# Patient Record
Sex: FEMALE | Race: OTHER | HISPANIC OR LATINO | URBAN - METROPOLITAN AREA
[De-identification: names, ages, dates, MRNs, and addresses within clinical notes are randomized per-mention and may not be internally consistent; named-entity substitution may affect disease eponyms.]

---

## 2019-02-18 ENCOUNTER — EMERGENCY (EMERGENCY)
Facility: HOSPITAL | Age: 79
LOS: 1 days | Discharge: HOME | End: 2019-02-18
Attending: EMERGENCY MEDICINE | Admitting: EMERGENCY MEDICINE
Payer: MEDICARE

## 2019-02-18 VITALS
RESPIRATION RATE: 18 BRPM | SYSTOLIC BLOOD PRESSURE: 142 MMHG | HEART RATE: 109 BPM | TEMPERATURE: 102 F | OXYGEN SATURATION: 98 % | DIASTOLIC BLOOD PRESSURE: 80 MMHG

## 2019-02-18 DIAGNOSIS — K57.80 DIVERTICULITIS OF INTESTINE, PART UNSPECIFIED, WITH PERFORATION AND ABSCESS WITHOUT BLEEDING: Chronic | ICD-10-CM

## 2019-02-18 DIAGNOSIS — J20.5 ACUTE BRONCHITIS DUE TO RESPIRATORY SYNCYTIAL VIRUS: ICD-10-CM

## 2019-02-18 DIAGNOSIS — T81.89XA OTHER COMPLICATIONS OF PROCEDURES, NOT ELSEWHERE CLASSIFIED, INITIAL ENCOUNTER: ICD-10-CM

## 2019-02-18 DIAGNOSIS — E83.42 HYPOMAGNESEMIA: ICD-10-CM

## 2019-02-18 DIAGNOSIS — I10 ESSENTIAL (PRIMARY) HYPERTENSION: ICD-10-CM

## 2019-02-18 DIAGNOSIS — R10.9 UNSPECIFIED ABDOMINAL PAIN: ICD-10-CM

## 2019-02-18 DIAGNOSIS — R10.84 GENERALIZED ABDOMINAL PAIN: ICD-10-CM

## 2019-02-18 DIAGNOSIS — Z90.49 ACQUIRED ABSENCE OF OTHER SPECIFIED PARTS OF DIGESTIVE TRACT: Chronic | ICD-10-CM

## 2019-02-18 DIAGNOSIS — E87.6 HYPOKALEMIA: ICD-10-CM

## 2019-02-18 DIAGNOSIS — E78.5 HYPERLIPIDEMIA, UNSPECIFIED: ICD-10-CM
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
## 2019-02-18 LAB
ALBUMIN SERPL ELPH-MCNC: 2.8 G/DL — LOW (ref 3.4–5)
ALP SERPL-CCNC: 169 U/L — HIGH (ref 40–120)
ALT FLD-CCNC: 50 U/L — HIGH (ref 12–42)
ANION GAP SERPL CALC-SCNC: 14 MMOL/L — SIGNIFICANT CHANGE UP (ref 9–16)
APTT BLD: 29.3 SEC — SIGNIFICANT CHANGE UP (ref 27.5–36.3)
AST SERPL-CCNC: 61 U/L — HIGH (ref 15–37)
BASOPHILS NFR BLD AUTO: 0.5 % — SIGNIFICANT CHANGE UP (ref 0–2)
BILIRUB SERPL-MCNC: 0.3 MG/DL — SIGNIFICANT CHANGE UP (ref 0.2–1.2)
BUN SERPL-MCNC: 8 MG/DL — SIGNIFICANT CHANGE UP (ref 7–23)
CALCIUM SERPL-MCNC: 8.6 MG/DL — SIGNIFICANT CHANGE UP (ref 8.5–10.5)
CHLORIDE SERPL-SCNC: 103 MMOL/L — SIGNIFICANT CHANGE UP (ref 96–108)
CO2 SERPL-SCNC: 22 MMOL/L — SIGNIFICANT CHANGE UP (ref 22–31)
CREAT SERPL-MCNC: 0.48 MG/DL — LOW (ref 0.5–1.3)
EOSINOPHIL NFR BLD AUTO: 0.4 % — SIGNIFICANT CHANGE UP (ref 0–6)
FLU A RESULT: SIGNIFICANT CHANGE UP
FLU A RESULT: SIGNIFICANT CHANGE UP
FLUAV AG NPH QL: SIGNIFICANT CHANGE UP
FLUBV AG NPH QL: SIGNIFICANT CHANGE UP
GLUCOSE SERPL-MCNC: 117 MG/DL — HIGH (ref 70–99)
HCT VFR BLD CALC: 33.2 % — LOW (ref 34.5–45)
HGB BLD-MCNC: 10.9 G/DL — LOW (ref 11.5–15.5)
IMM GRANULOCYTES NFR BLD AUTO: 0.5 % — SIGNIFICANT CHANGE UP (ref 0–1.5)
INR BLD: 1.15 — SIGNIFICANT CHANGE UP (ref 0.88–1.16)
LACTATE SERPL-SCNC: 0.8 MMOL/L — SIGNIFICANT CHANGE UP (ref 0.4–2)
LYMPHOCYTES # BLD AUTO: 17 % — SIGNIFICANT CHANGE UP (ref 13–44)
MCHC RBC-ENTMCNC: 32.8 G/DL — SIGNIFICANT CHANGE UP (ref 32–36)
MCHC RBC-ENTMCNC: 33.1 PG — SIGNIFICANT CHANGE UP (ref 27–34)
MCV RBC AUTO: 100.9 FL — HIGH (ref 80–100)
MONOCYTES NFR BLD AUTO: 8.1 % — SIGNIFICANT CHANGE UP (ref 2–14)
NEUTROPHILS NFR BLD AUTO: 73.5 % — SIGNIFICANT CHANGE UP (ref 43–77)
PLATELET # BLD AUTO: 236 K/UL — SIGNIFICANT CHANGE UP (ref 150–400)
POTASSIUM SERPL-MCNC: 2.8 MMOL/L — CRITICAL LOW (ref 3.5–5.3)
POTASSIUM SERPL-SCNC: 2.8 MMOL/L — CRITICAL LOW (ref 3.5–5.3)
PROT SERPL-MCNC: 6.6 G/DL — SIGNIFICANT CHANGE UP (ref 6.4–8.2)
PROTHROM AB SERPL-ACNC: 12.8 SEC — SIGNIFICANT CHANGE UP (ref 10–12.9)
RBC # BLD: 3.29 M/UL — LOW (ref 3.8–5.2)
RBC # FLD: 15.5 % — HIGH (ref 10.3–14.5)
RSV RESULT: DETECTED
RSV RNA RESP QL NAA+PROBE: DETECTED
SODIUM SERPL-SCNC: 139 MMOL/L — SIGNIFICANT CHANGE UP (ref 132–145)
WBC # BLD: 5.7 K/UL — SIGNIFICANT CHANGE UP (ref 3.8–10.5)
WBC # FLD AUTO: 5.7 K/UL — SIGNIFICANT CHANGE UP (ref 3.8–10.5)

## 2019-02-18 PROCEDURE — 74176 CT ABD & PELVIS W/O CONTRAST: CPT | Mod: 26

## 2019-02-18 PROCEDURE — 99284 EMERGENCY DEPT VISIT MOD MDM: CPT | Mod: 25

## 2019-02-18 PROCEDURE — 93010 ELECTROCARDIOGRAM REPORT: CPT

## 2019-02-18 PROCEDURE — 71250 CT THORAX DX C-: CPT | Mod: 26

## 2019-02-18 RX ORDER — ACETAMINOPHEN 500 MG
975 TABLET ORAL ONCE
Qty: 0 | Refills: 0 | Status: COMPLETED | OUTPATIENT
Start: 2019-02-18 | End: 2019-02-18

## 2019-02-18 RX ORDER — POTASSIUM CHLORIDE 20 MEQ
40 PACKET (EA) ORAL ONCE
Qty: 0 | Refills: 0 | Status: COMPLETED | OUTPATIENT
Start: 2019-02-18 | End: 2019-02-18

## 2019-02-18 RX ORDER — SODIUM CHLORIDE 9 MG/ML
2000 INJECTION INTRAMUSCULAR; INTRAVENOUS; SUBCUTANEOUS ONCE
Qty: 0 | Refills: 0 | Status: DISCONTINUED | OUTPATIENT
Start: 2019-02-18 | End: 2019-02-19

## 2019-02-18 RX ORDER — CEFTRIAXONE 500 MG/1
1 INJECTION, POWDER, FOR SOLUTION INTRAMUSCULAR; INTRAVENOUS ONCE
Qty: 0 | Refills: 0 | Status: DISCONTINUED | OUTPATIENT
Start: 2019-02-18 | End: 2019-02-19

## 2019-02-18 RX ADMIN — Medication 975 MILLIGRAM(S): at 22:08

## 2019-02-18 NOTE — ED PROVIDER NOTE - NSRISKOFTRANSFER_ED_A_ED
Deterioration of Condition En Route/Increased Pain/Transportation Risk (There is always a risk of traffic delays resulting in deterioration of condition.)/Death or Disability

## 2019-02-18 NOTE — ED PROVIDER NOTE - CLINICAL SUMMARY MEDICAL DECISION MAKING FREE TEXT BOX
meets sepsis criteria at triage, sepsis labs/antibiotics/fluids ordered, flu swab, imaging.     //after many attempts by 4 different ED providers, unable to obtain IV access. labs sent. patient does not wish for us to try to obtain IV access anymore at this time.

## 2019-02-18 NOTE — ED PROVIDER NOTE - OBJECTIVE STATEMENT
79 y/o female with PMHx HTN, HLD, diverticulitis, PSHx of cholecystectomy, NKDA, presents to ED with c/o abd pain x1 day. Pt states she was at home today when she began to feel worsening left abd pain and HA. Pt reports having a recent surgery at [facility] for perforated diverticulitis on Dec 27. She reports she was in the hospital for 2 weeks after the operation, and she was then in [facility] for appx 4 weeks (discharged on 2/14). Pt presents today with a colostomy bag with a small amount of stool. She states she received a new bag after her operation and she last cleaned it this morning. She admits to vomiting today, cough/congestion (x1 day), nausea, abd pain, HA. Pt denies diarrhea, no hx of stroke, no hx of kidney or heart problems. 79 y/o female with PMHx HTN, HLD PSHx of cholecystectomy, s/p perforated diverticuli with ostomy bag in place end of December 2018 at [facility] with Will Nye presents to ED with c/o abdominal pain x1 day. Pt states she was at home today when she began to feel abdominal pain. Pt reports having a recent surgery at [facility] for perforated diverticulitis on Dec 27 - she reports she was in the hospital for 2 weeks after the surgery and she was then in [facility] for appx 4 weeks (discharged on 2/14). Pt presents today with a colostomy bag with a small amount of stool. She states she received a new bag after her operation and she last cleaned it this morning. She admits to vomiting today, cough/congestion (x1 day), nausea, abd pain, HA. Pt denies diarrhea, no hx of stroke, no hx of kidney or heart problems. 79 y/o female with PMHx HTN, HLD PSHx of cholecystectomy, s/p perforated diverticuli with ostomy bag in place end of December 2018 at [facility] with Will Nye presents to ED with c/o abdominal pain x1 day. Pt states she was at home today when she began to feel abdominal pain. Pt reports having a recent surgery at [facility] for perforated diverticulitis on Dec 27 - she reports she was in the hospital for 2 weeks after the surgery, was then in [facility] for appropriately 4 weeks (discharged on 2/14). c/o vomiting today, cough/congestion (x1 day), nausea, abominal pain. Pt denies diarrhea. No fever or chills. 77 y/o female with PMHx HTN, HLD PSHx of cholecystectomy, s/p perforated diverticuli with ostomy bag in place, surgery on Dec 27, 2018 at [facility] with Will Nye presents to ED with c/o abdominal pain x1 day with associated nausea and vomiting. Also c/o cough/congestion x 2 days. Pt denies diarrhea. No fever or chills. Normal amount of stool output in colostomy bag. 79 y/o female with PMHx HTN, HLD PSHx of cholecystectomy, s/p perforated diverticuli with ostomy bag in place, surgery on Dec 27, 2018 at [facility] with Will Nye presents to ED with c/o abdominal pain x 1 day with associated nausea and vomiting. Also c/o cough/congestion x 2 days. Pt denies diarrhea. No fever or chills. Normal amount of stool output in colostomy bag.

## 2019-02-18 NOTE — ED PROVIDER NOTE - PROGRESS NOTE DETAILS
CT shows 4.6 x 2.0 x 2.4 cm gas and soft tissue/fluid collection in the infraumbilical midline incision, appeared to communicate with the skin surface. Spoke with [facility] Dr. Mark covering for Dr. Medellin, will admit to [facility] surgical service. discussed case with Dr. Mark, will hold off on antibiotics at this time. no IV access, patient declining central line and states she would rather have all IV access placed at [facility]. +RSV, will place on droplet/contact isolation. potassium 2.8 supplemented with oral potassium. patient agrees with plan

## 2019-02-18 NOTE — ED ADULT NURSE NOTE - OBJECTIVE STATEMENT
resents to ED with c/o abd pain x1 day. Pt states she was at home today when she began to feel worsening left abd pain and HA. Pt reports having a recent surgery at [facility] for perforated diverticulitis

## 2019-02-18 NOTE — ED PROVIDER NOTE - PHYSICAL EXAMINATION
VITAL SIGNS: I have reviewed nursing notes and confirm.  CONSTITUTIONAL: Well-developed; well-nourished; in no acute distress.  SKIN: Skin is warm and dry, no acute rash.  HEAD: Normocephalic; atraumatic.  EYES: PERRL, EOM intact; conjunctiva and sclera clear.  ENT: No nasal discharge; airway clear.  NECK: Supple; non tender.  CARD: S1, S2 normal; no murmurs, gallops, or rubs. Regular rate and rhythm.  RESP: No wheezes, rales or rhonchi.  ABD: Stoma with ostomy pouch to LLQ. Normal bowel sounds; soft; non-distended; non-tender; no hepatosplenomegaly.  EXT: Normal ROM. No clubbing, cyanosis or edema.  NEURO: Alert, oriented. Grossly unremarkable.  PSYCH: Cooperative, appropriate. VITAL SIGNS: I have reviewed nursing notes and confirm.  CONSTITUTIONAL: Well-developed; well-nourished; in no acute distress.  SKIN: Skin is warm and dry, no acute rash.  HEAD: Normocephalic; atraumatic.  EYES: PERRL, EOM intact; conjunctiva and sclera clear.  ENT: No nasal discharge; airway clear.  NECK: Supple; non tender.  CARD: S1, S2 normal; no murmurs, gallops, or rubs. +tachycarida.  RESP: No wheezes, rales or rhonchi.  ABD: Stoma with ostomy pouch to LLQ.  soft; non-distended; +mild tenderness around stoma site. midline incision partially open with packing in place with serosanguinous drainage.   EXT: Normal ROM x 4  NEURO: Alert, oriented. Grossly unremarkable.  PSYCH: Cooperative, appropriate. VITAL SIGNS: I have reviewed nursing notes and confirm.  CONSTITUTIONAL: Well-developed; well-nourished; in no acute distress.  SKIN: Skin is warm and dry, no acute rash.  HEAD: Normocephalic; atraumatic.  EYES: PERRL, EOM intact; conjunctiva and sclera clear.  ENT: No nasal discharge; airway clear.  NECK: Supple; non tender.  CARD: S1, S2 normal; no murmurs, gallops, or rubs. +tachycarida.  RESP: No wheezes, rales or rhonchi.  ABD: +ostomy to LLQ.  soft; non-distended; +mild tenderness around ostomy site, red mucousa around ostomy site. midline incision partially open with packing in place with serosanguinous drainage.   EXT: Normal ROM x 4  NEURO: Alert, oriented. Grossly unremarkable.  PSYCH: Cooperative, appropriate.

## 2019-02-18 NOTE — ED ADULT NURSE NOTE - NSIMPLEMENTINTERV_GEN_ALL_ED
Implemented All Universal Safety Interventions:  [place] to call system. Call bell, personal items and telephone within reach. Instruct patient to call for assistance. Room bathroom lighting operational. Non-slip footwear when patient is off stretcher. Physically safe environment: no spills, clutter or unnecessary equipment. Stretcher in lowest position, wheels locked, appropriate side rails in place.

## 2019-02-18 NOTE — ED ADULT NURSE REASSESSMENT NOTE - NS ED NURSE REASSESS COMMENT FT1
Pt resting in bed. Pt complaining of slight headache, tylenols provided. Vitals as per flow sheet. Plan discussed with PA. Pending CT and labs. Will continue to monitor.

## 2019-02-18 NOTE — ED PROVIDER NOTE - ATTENDING CONTRIBUTION TO CARE
Patient in ED with concern for flu like symptoms x 2 days.  Pt w recent colostomy placement following perforated diverticli in Dec 2012 out of [facility].  She notes n/v, lower abd pain.  No change to ostomy output.  On my face to face ED eval patient appears well.  Colostomy in place with brown stool in bag, non tender abd, non distended.  Lungs clear.  Patient is a difficult IV stick and following multiple attempts at IV access patient is unwilling to allow for further attempts.  Straight stick completed and labs sent.  CT abd pel non contrast ordered.  RVP +.  Patient given antipyretic and will await return of labs and imaging.  If results do not necessitate central line placement, admission, etc will attempt oral hydration and re evaluate to see if patient feeling ok for disposition home given her well appearance.  Patient is aware of plan and agreeable.  Will await work up completion, re evaluate and disposition accordingly.

## 2019-02-18 NOTE — ED PROVIDER NOTE - CARE PLAN
Principal Discharge DX:	Abdominal pain  Secondary Diagnosis:	Fluid collection at surgical site  Secondary Diagnosis:	RSV bronchitis Principal Discharge DX:	Abdominal pain  Secondary Diagnosis:	Fluid collection at surgical site  Secondary Diagnosis:	RSV bronchitis  Secondary Diagnosis:	Hypokalemia  Secondary Diagnosis:	Hypomagnesemia

## 2019-02-18 NOTE — ED ADULT TRIAGE NOTE - CHIEF COMPLAINT QUOTE
Patient arrives via EMS complaining of not feeling well, cough/congestion, nausea/vomiting; states she was discharged from a rehab facility on 2/14 after having an abdominal surgery back in december

## 2019-02-18 NOTE — ED ADULT NURSE REASSESSMENT NOTE - NS ED NURSE REASSESS COMMENT FT1
After multiple attempts for IV access by myself,   Alisa Kamara, Dr. King, and Renee we are unable to gain IV access.  Patient sent to CT scan and patient handed off to MYRON Case.  Plan of care is for patient to get a central line in femoral when returns from non-contrast CT scan

## 2019-02-19 VITALS
HEART RATE: 94 BPM | RESPIRATION RATE: 16 BRPM | OXYGEN SATURATION: 96 % | DIASTOLIC BLOOD PRESSURE: 64 MMHG | SYSTOLIC BLOOD PRESSURE: 109 MMHG

## 2019-02-19 LAB
APPEARANCE UR: CLEAR — SIGNIFICANT CHANGE UP
BILIRUB UR-MCNC: NEGATIVE — SIGNIFICANT CHANGE UP
COLOR SPEC: YELLOW — SIGNIFICANT CHANGE UP
DIFF PNL FLD: ABNORMAL
GLUCOSE UR QL: NEGATIVE — SIGNIFICANT CHANGE UP
KETONES UR-MCNC: 15 MG/DL
LEUKOCYTE ESTERASE UR-ACNC: NEGATIVE — SIGNIFICANT CHANGE UP
MAGNESIUM SERPL-MCNC: 1 MG/DL — CRITICAL LOW (ref 1.6–2.6)
NITRITE UR-MCNC: NEGATIVE — SIGNIFICANT CHANGE UP
PH UR: 5.5 — SIGNIFICANT CHANGE UP (ref 5–8)
PROT UR-MCNC: NEGATIVE MG/DL — SIGNIFICANT CHANGE UP
SP GR SPEC: 1.01 — SIGNIFICANT CHANGE UP (ref 1–1.03)
UROBILINOGEN FLD QL: 0.2 E.U./DL — SIGNIFICANT CHANGE UP

## 2019-02-19 RX ORDER — MAGNESIUM OXIDE 400 MG ORAL TABLET 241.3 MG
400 TABLET ORAL ONCE
Qty: 0 | Refills: 0 | Status: COMPLETED | OUTPATIENT
Start: 2019-02-19 | End: 2019-02-19

## 2019-02-19 RX ADMIN — Medication 40 MILLIEQUIVALENT(S): at 00:28

## 2019-02-19 RX ADMIN — MAGNESIUM OXIDE 400 MG ORAL TABLET 400 MILLIGRAM(S): 241.3 TABLET ORAL at 01:11

## 2019-02-20 LAB
CULTURE RESULTS: SIGNIFICANT CHANGE UP
SPECIMEN SOURCE: SIGNIFICANT CHANGE UP

## 2019-02-24 LAB
CULTURE RESULTS: SIGNIFICANT CHANGE UP
CULTURE RESULTS: SIGNIFICANT CHANGE UP
SPECIMEN SOURCE: SIGNIFICANT CHANGE UP
SPECIMEN SOURCE: SIGNIFICANT CHANGE UP

## 2020-09-08 ENCOUNTER — EMERGENCY (EMERGENCY)
Facility: HOSPITAL | Age: 80
LOS: 1 days | Discharge: HOME | End: 2020-09-08
Attending: EMERGENCY MEDICINE | Admitting: EMERGENCY MEDICINE
Payer: MEDICARE

## 2020-09-08 VITALS
TEMPERATURE: 98 F | HEIGHT: 59 IN | HEART RATE: 61 BPM | DIASTOLIC BLOOD PRESSURE: 99 MMHG | OXYGEN SATURATION: 96 % | WEIGHT: 149.91 LBS | RESPIRATION RATE: 18 BRPM | SYSTOLIC BLOOD PRESSURE: 174 MMHG

## 2020-09-08 DIAGNOSIS — K57.80 DIVERTICULITIS OF INTESTINE, PART UNSPECIFIED, WITH PERFORATION AND ABSCESS WITHOUT BLEEDING: Chronic | ICD-10-CM

## 2020-09-08 DIAGNOSIS — Z90.49 ACQUIRED ABSENCE OF OTHER SPECIFIED PARTS OF DIGESTIVE TRACT: Chronic | ICD-10-CM

## 2020-09-08 PROBLEM — K57.92 DIVERTICULITIS OF INTESTINE, PART UNSPECIFIED, WITHOUT PERFORATION OR ABSCESS WITHOUT BLEEDING: Chronic | Status: ACTIVE | Noted: 2019-02-18

## 2020-09-08 PROBLEM — E78.5 HYPERLIPIDEMIA, UNSPECIFIED: Chronic | Status: ACTIVE | Noted: 2019-02-18

## 2020-09-08 PROBLEM — I10 ESSENTIAL (PRIMARY) HYPERTENSION: Chronic | Status: ACTIVE | Noted: 2019-02-18

## 2020-09-08 PROCEDURE — 99283 EMERGENCY DEPT VISIT LOW MDM: CPT | Mod: CS

## 2020-09-08 NOTE — ED PROVIDER NOTE - OBJECTIVE STATEMENT
Pt is a 81 y/o female with a PMHx of HTN, HLD, and Diverticulitis presenting to the ED requesting COVID-19 screening. Patient is currently asymptomatic and has no other medical complaints at this time. Denies fever, chills, chest pain, SOB. Pt is a 79 y/o female with a PMHx of HTN, HLD, and Diverticulitis presenting to the ED requesting COVID-19 screening. WIll attend her son's wedding soon. Patient is currently asymptomatic and has no other medical complaints at this time. Denies fever, chills, chest pain, SOB.

## 2020-09-08 NOTE — ED PROVIDER NOTE - CLINICAL SUMMARY MEDICAL DECISION MAKING FREE TEXT BOX
Asymptomatic patient here for COVID screening.  Risk of exposure is very low.  Reviewed vitals and testing plan with the patient.  Encouraged to download the follow my health nakia for test results. Asymptomatic patient here for COVID screening.  Risk of exposure is very low.  Reviewed testing plan with the patient.  Encouraged to download the follow my health nakia for test results.

## 2020-09-08 NOTE — ED PROVIDER NOTE - PATIENT PORTAL LINK FT
You can access the FollowMyHealth Patient Portal offered by [facility] by registering at the following website: [email or web address]. By joining [employer]’s FollowMyHealth portal, you will also be able to view your health information using other applications (apps) compatible with our system.

## 2020-09-08 NOTE — ED ADULT NURSE NOTE - NSIMPLEMENTINTERV_GEN_ALL_ED
Implemented All Fall Risk Interventions:  [place] to call system. Call bell, personal items and telephone within reach. Instruct patient to call for assistance. Room bathroom lighting operational. Non-slip footwear when patient is off stretcher. Physically safe environment: no spills, clutter or unnecessary equipment. Stretcher in lowest position, wheels locked, appropriate side rails in place. Provide visual cue, wrist band, yellow gown, etc. Monitor gait and stability. Monitor for mental status changes and reorient to person, place, and time. Review medications for side effects contributing to fall risk. Reinforce activity limits and safety measures with patient and family.

## 2020-09-08 NOTE — ED PROVIDER NOTE - NSFOLLOWUPINSTRUCTIONS_ED_ALL_ED_FT
THE COVID 19 TEST   - results may take up to 5-7 days to become available   - if your result is positive, you will receive a phone call from one of our coronavirus specialists   - negative result may not be communicated on time due to the sheer volume of testing from the ER. If you haven't heard from us within 5 days, you can check FollowInflux [place] or call [phone] (please do not call the ER for results)    Please continue to self quarantine (home isolation) until your result is back and follow instructions accordingly  - positive: complete home isolation for a total of 14 days since day of testing and no more fever with feeling back to baseline   - negative: you will be able to stop home isolation but still practice standard precautions, similar to how you would manage a regular flu/cold     Return to ER for any worsening symptoms, such as persistent fever >100.4F, shortness of breath, coughing up bloody sputum, chest pain, lethargy, and fainting    Please remember to wash your hands frequently (>20 seconds each time), avoid touching your face, and cover your cough/sneeze    Only take tylenol for fever/pain control and avoid NSAIDs (ibuprofen/advil/aleve/naproxen) due to potential increased risk of exacerbating COVID-19 infection

## 2020-09-12 DIAGNOSIS — Z20.828 CONTACT WITH AND (SUSPECTED) EXPOSURE TO OTHER VIRAL COMMUNICABLE DISEASES: ICD-10-CM

## 2020-09-12 DIAGNOSIS — Z11.59 ENCOUNTER FOR SCREENING FOR OTHER VIRAL DISEASES: ICD-10-CM
